# Patient Record
Sex: MALE | Race: WHITE | NOT HISPANIC OR LATINO | Employment: OTHER | URBAN - METROPOLITAN AREA
[De-identification: names, ages, dates, MRNs, and addresses within clinical notes are randomized per-mention and may not be internally consistent; named-entity substitution may affect disease eponyms.]

---

## 2020-09-07 ENCOUNTER — HOSPITAL ENCOUNTER (EMERGENCY)
Facility: HOSPITAL | Age: 45
Discharge: HOME | End: 2020-09-07
Attending: EMERGENCY MEDICINE | Admitting: EMERGENCY MEDICINE

## 2020-09-07 ENCOUNTER — APPOINTMENT (OUTPATIENT)
Dept: CT IMAGING | Facility: HOSPITAL | Age: 45
End: 2020-09-07

## 2020-09-07 VITALS
BODY MASS INDEX: 26.66 KG/M2 | DIASTOLIC BLOOD PRESSURE: 82 MMHG | TEMPERATURE: 99.1 F | HEIGHT: 69 IN | WEIGHT: 180 LBS | RESPIRATION RATE: 18 BRPM | HEART RATE: 77 BPM | OXYGEN SATURATION: 96 % | SYSTOLIC BLOOD PRESSURE: 120 MMHG

## 2020-09-07 DIAGNOSIS — R51.9 ACUTE NONINTRACTABLE HEADACHE, UNSPECIFIED HEADACHE TYPE: Primary | ICD-10-CM

## 2020-09-07 PROCEDURE — 99283 EMERGENCY DEPT VISIT LOW MDM: CPT

## 2020-09-07 PROCEDURE — 70450 CT HEAD/BRAIN W/O DYE: CPT

## 2020-09-07 PROCEDURE — 25010000002 METOCLOPRAMIDE PER 10 MG: Performed by: EMERGENCY MEDICINE

## 2020-09-07 PROCEDURE — 96374 THER/PROPH/DIAG INJ IV PUSH: CPT

## 2020-09-07 PROCEDURE — 96375 TX/PRO/DX INJ NEW DRUG ADDON: CPT

## 2020-09-07 PROCEDURE — 25010000002 DIPHENHYDRAMINE PER 50 MG: Performed by: EMERGENCY MEDICINE

## 2020-09-07 PROCEDURE — 25010000002 KETOROLAC TROMETHAMINE PER 15 MG: Performed by: EMERGENCY MEDICINE

## 2020-09-07 PROCEDURE — 25010000002 DEXAMETHASONE SODIUM PHOSPHATE 20 MG/5ML SOLUTION: Performed by: EMERGENCY MEDICINE

## 2020-09-07 RX ORDER — DIPHENHYDRAMINE HYDROCHLORIDE 50 MG/ML
25 INJECTION INTRAMUSCULAR; INTRAVENOUS ONCE
Status: COMPLETED | OUTPATIENT
Start: 2020-09-07 | End: 2020-09-07

## 2020-09-07 RX ORDER — KETOROLAC TROMETHAMINE 15 MG/ML
15 INJECTION, SOLUTION INTRAMUSCULAR; INTRAVENOUS ONCE
Status: COMPLETED | OUTPATIENT
Start: 2020-09-07 | End: 2020-09-07

## 2020-09-07 RX ORDER — METOCLOPRAMIDE HYDROCHLORIDE 5 MG/ML
10 INJECTION INTRAMUSCULAR; INTRAVENOUS ONCE
Status: COMPLETED | OUTPATIENT
Start: 2020-09-07 | End: 2020-09-07

## 2020-09-07 RX ORDER — DEXAMETHASONE IN 0.9 % SOD CHL 10 MG/50ML
10 INTRAVENOUS SOLUTION, PIGGYBACK (ML) INTRAVENOUS ONCE
Status: COMPLETED | OUTPATIENT
Start: 2020-09-07 | End: 2020-09-07

## 2020-09-07 RX ORDER — SODIUM CHLORIDE 0.9 % (FLUSH) 0.9 %
10 SYRINGE (ML) INJECTION AS NEEDED
Status: DISCONTINUED | OUTPATIENT
Start: 2020-09-07 | End: 2020-09-07 | Stop reason: HOSPADM

## 2020-09-07 RX ADMIN — DIPHENHYDRAMINE HYDROCHLORIDE 25 MG: 50 INJECTION INTRAMUSCULAR; INTRAVENOUS at 18:19

## 2020-09-07 RX ADMIN — METOCLOPRAMIDE 10 MG: 5 INJECTION, SOLUTION INTRAMUSCULAR; INTRAVENOUS at 18:19

## 2020-09-07 RX ADMIN — KETOROLAC TROMETHAMINE 15 MG: 15 INJECTION, SOLUTION INTRAMUSCULAR; INTRAVENOUS at 18:33

## 2020-09-07 RX ADMIN — DEXAMETHASONE SODIUM PHOSPHATE 10 MG: 4 INJECTION, SOLUTION INTRA-ARTICULAR; INTRALESIONAL; INTRAMUSCULAR; INTRAVENOUS; SOFT TISSUE at 18:32

## 2021-01-06 ENCOUNTER — TRANSCRIBE ORDERS (OUTPATIENT)
Dept: NUTRITION | Facility: HOSPITAL | Age: 46
End: 2021-01-06

## 2021-01-06 DIAGNOSIS — R11.2 NAUSEA AND VOMITING, INTRACTABILITY OF VOMITING NOT SPECIFIED, UNSPECIFIED VOMITING TYPE: ICD-10-CM

## 2021-01-06 DIAGNOSIS — R10.9 ABDOMINAL PAIN, UNSPECIFIED ABDOMINAL LOCATION: Primary | ICD-10-CM

## 2021-01-06 DIAGNOSIS — R19.5 LOOSE STOOLS: ICD-10-CM

## 2021-01-29 ENCOUNTER — HOSPITAL ENCOUNTER (OUTPATIENT)
Dept: NUTRITION | Facility: HOSPITAL | Age: 46
Setting detail: RECURRING SERIES
Discharge: HOME | End: 2021-01-29

## 2021-01-29 VITALS — BODY MASS INDEX: 26.64 KG/M2 | HEIGHT: 69 IN | WEIGHT: 179.9 LBS

## 2021-01-29 PROCEDURE — 97802 MEDICAL NUTRITION INDIV IN: CPT | Performed by: DIETITIAN, REGISTERED

## 2021-01-29 NOTE — CONSULTS
Adult Outpatient Nutrition  Assessment/PES    Patient Name:  Sujit Oleary  YOB: 1975  MRN: [id number]    Assessment Date:  1/29/2021    Comments:  Telephone nutrition consult, 60 minutes. This medical referred consult was provided as a zoom visit, as patient is unable to attend an in-office appointment due to the COVID-19 crisis. Consent for treatment was given verbally.    Patient describes problems with newly diagnosed Celiac disease. Per faxed documentation, IgA panel was negative, however patient states that diagnosis was confirmed via endoscopy/biopsy in early January. Since that time he has been doing his best to follow a gluten free diet but is struggling to find options that he can cook for his family. He is here today for gluten free diet education.    During the session we discussed basic principles of gluten free diet including consumption of naturally gluten free foods as well as gluten free alternatives. Discussed label reading with patient and emphasized importance of checking ingredient lists for gluten-containing additives as well as allergen warnings for processing cross-contamination. Reviewed restaurant nutrition and common sources of cross-contamination such as grills, fryers and utinsels. Encouraged patient to ask questions of each establishment as to meal preparation, surfaces used, etc. 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 . Reviewed health and beauty products that can contain gluten and again encouraged patient to read labels and/or call  to clarify contents and processing methods of commonly used products. Patient is an eager learner and presents as strong self-advocate    Goals:  - GF diet  - maintain weight    Total of 60 minutes spent with patient on nutrition counseling. Education based on Academy of  "Dietetics and Nutrition guidelines. Patient was provided with RD's contact information. Follow up visit is scheduled for 2/15/21 at 11:15 am. Thank you for this referral.    General Info     Row Name 01/29/21 1124       Today's Session    Person(s) attending today's session  Patient     Services Used Today?  No       General Information    How Well Do You Speak English?  very well    Do You Speak a Language Other Than English at Home?  no    Are you able to read and write English?  Yes    Lives With  child(yenny), dependent;spouse    Is patient pregnant?  n/a       Relationship/Environment    Name(s) of Who Lives With Patient  15 y/o daughter, wife          Anthropometrics     Row Name 01/29/21 1125          Anthropometrics    Height  175 cm (68.9\")     Weight  81.6 kg (179 lb 14.3 oz)        Ideal Body Weight (IBW)    Ideal Body Weight (IBW) (kg)  73.4     % Ideal Body Weight  111.17        Body Mass Index (BMI)    BMI (kg/m2)  26.7         Nutritional Info/Activity     Row Name 01/29/21 1136       Nutritional Information    Have you had weight changes?  No    Have you tried to lose weight before?  No    Food Allergies  wheat;other (see comments);fish/shellfish Celiac    History of eating disorder?  No    What cultural diet influences are important for you to follow?  none    Do you have difficulty chewing food?  No    Functional Status  able to prepare meals;able to purchase food;ambulatory    How often during the day do you find yourself snacking?  0    How often do you eat out and where?  3-4 times per week    How many times do you eat fruit per day?  1    How many times do you eat vegetables per day?  1    How many times do you drink juice per day?  1    How many times do you eat snack foods per day?  1    How many times do you eat ethnic food per day?  1    How many times do you have caffeine per day?  1    How many meals do you eat each day?  3    How many snacks do you eat each day?  0    What is " "the biggest challenge you have with your diet?  Food causing negative symptoms    What type of support do you currently use to help you with your health issues?  none    Enter everything you can remember eating in the last 24 hours (1 day)  Breakfast: granola bar Lunch: vegetable soup, grilled cheese, chips, Dinner: basil fried chicken with rice, vegetable egg roll       Eating Environment    Eating environment  Family;Work       Physical Activity    Are you currently involved in an activity/exercise program?   No    How would you rank exercise as an important health lifestyle practice?  8        Home Nutrition Report     Row Name 01/29/21 1140          Home Nutrition Report    Diet  No specific         Estimated/Assessed Needs     Row Name 01/29/21 1125          Calculation Measurements    Weight Used For Calculations  68.9 kg (151 lb 14.4 oz)     Height  175 cm (68.9\")        Estimated/Assessed Needs    Additional Documentation  [employer]-St. Jeor Equation (Group)        [employer]-St. Jeor Equation    RMR ([employer]-St. Jeor Equation)  1552.01894     [employer]-St. Jeor Activity Factors  1.2     Activity Factors ([employer]-St. Jeor)  1863.3069           Labs/Tests/Procedures/Meds     Row Name 01/29/21 1140          Labs/Procedures/Meds    Lab Results Reviewed  reviewed        Diagnostic Tests/Procedures    Diagnostic Test/Procedure Reviewed  reviewed        Medications    Pertinent Medications Reviewed  reviewed             Problem/Interventions:  Problem 1     Row Name 01/29/21 1141          Nutrition Diagnoses Problem 1    Problem 1  Altered GI Function     Etiology (related to)  Medical Diagnosis     Gastrointestinal  Celiac disease     Signs/Symptoms (evidenced by)  Report/Observation     Reported/Observed By  MD     Reported GI Symptoms  GI distress;Nausea and vomiting;Diarrhea     Allergy/Food Intolerance  Gluten                 Intervention Goal     Row Name 01/29/21 1141          Intervention Goal    General  " Reduce/improve symptoms;Meet nutritional needs for age/condition     PO  Meet estimated needs     Weight  Maintain weight           Nutrition Prescription     Row Name 01/29/21 1141          Nutrition Prescription PO    PO Prescription  Begin/change diet     Begin/Change Diet to  Regular     Common Modifiers  Gluten Free     New PO Prescription Ordered?  No, recommended         Education/Evaluation     Row Name 01/29/21 1142          Education    Education  Advised regarding habits/behavior;Provided education regarding;Education topics     Provided education regarding  Avoidance/improvement of symptoms;Avoidance of associated complications;Diet rationale;Medical diagnosis;Key food habit change     Education Topics  Celiac disease     Advised Regarding Habits/Behavior  Food shopping;Label reading;Eating out;Meal planning;Food choices;Snacks;Food prep        Monitor/Evaluation    Monitor  Per protocol     Education Follow-up  Other (comment) 2/15/21           Electronically signed by:  Betty Aldridge RD  01/29/21 11:44 EST

## 2021-02-15 ENCOUNTER — APPOINTMENT (OUTPATIENT)
Dept: NUTRITION | Facility: HOSPITAL | Age: 46
End: 2021-02-15

## 2022-08-29 ENCOUNTER — TELEPHONE ENCOUNTER (OUTPATIENT)
Dept: URBAN - METROPOLITAN AREA CLINIC 113 | Facility: CLINIC | Age: 47
End: 2022-08-29

## 2022-10-12 ENCOUNTER — OFFICE VISIT (OUTPATIENT)
Dept: URBAN - METROPOLITAN AREA SURGERY CENTER 25 | Facility: SURGERY CENTER | Age: 47
End: 2022-10-12
Payer: COMMERCIAL

## 2022-10-12 ENCOUNTER — WEB ENCOUNTER (OUTPATIENT)
Dept: URBAN - METROPOLITAN AREA SURGERY CENTER 25 | Facility: SURGERY CENTER | Age: 47
End: 2022-10-12

## 2022-10-12 DIAGNOSIS — Z12.11 COLON CANCER SCREENING: ICD-10-CM

## 2022-10-12 PROCEDURE — 45378 DIAGNOSTIC COLONOSCOPY: CPT | Performed by: INTERNAL MEDICINE

## 2022-10-12 PROCEDURE — G8907 PT DOC NO EVENTS ON DISCHARG: HCPCS | Performed by: INTERNAL MEDICINE
